# Patient Record
Sex: MALE | Race: BLACK OR AFRICAN AMERICAN | NOT HISPANIC OR LATINO
[De-identification: names, ages, dates, MRNs, and addresses within clinical notes are randomized per-mention and may not be internally consistent; named-entity substitution may affect disease eponyms.]

---

## 2023-08-03 PROBLEM — Z00.00 ENCOUNTER FOR PREVENTIVE HEALTH EXAMINATION: Status: ACTIVE | Noted: 2023-08-03

## 2023-08-07 ENCOUNTER — APPOINTMENT (OUTPATIENT)
Dept: CARDIOLOGY | Facility: CLINIC | Age: 73
End: 2023-08-07
Payer: MEDICARE

## 2023-08-07 VITALS
OXYGEN SATURATION: 97 % | DIASTOLIC BLOOD PRESSURE: 94 MMHG | SYSTOLIC BLOOD PRESSURE: 146 MMHG | HEART RATE: 69 BPM | WEIGHT: 243 LBS

## 2023-08-07 DIAGNOSIS — K57.90 DIVERTICULOSIS OF INTESTINE, PART UNSPECIFIED, W/OUT PERFORATION OR ABSCESS W/OUT BLEEDING: ICD-10-CM

## 2023-08-07 DIAGNOSIS — Z87.39 PERSONAL HISTORY OF OTHER DISEASES OF THE MUSCULOSKELETAL SYSTEM AND CONNECTIVE TISSUE: ICD-10-CM

## 2023-08-07 DIAGNOSIS — E78.5 HYPERLIPIDEMIA, UNSPECIFIED: ICD-10-CM

## 2023-08-07 DIAGNOSIS — N40.1 BENIGN PROSTATIC HYPERPLASIA WITH LOWER URINARY TRACT SYMPMS: ICD-10-CM

## 2023-08-07 DIAGNOSIS — I48.0 PAROXYSMAL ATRIAL FIBRILLATION: ICD-10-CM

## 2023-08-07 DIAGNOSIS — I10 ESSENTIAL (PRIMARY) HYPERTENSION: ICD-10-CM

## 2023-08-07 DIAGNOSIS — Z82.49 FAMILY HISTORY OF ISCHEMIC HEART DISEASE AND OTHER DISEASES OF THE CIRCULATORY SYSTEM: ICD-10-CM

## 2023-08-07 PROCEDURE — 99205 OFFICE O/P NEW HI 60 MIN: CPT | Mod: 25

## 2023-08-07 PROCEDURE — 93000 ELECTROCARDIOGRAM COMPLETE: CPT

## 2023-08-07 PROCEDURE — 36415 COLL VENOUS BLD VENIPUNCTURE: CPT

## 2023-08-07 RX ORDER — POTASSIUM CHLORIDE 10 MEQ
10 CAPSULE, EXTENDED RELEASE ORAL
Refills: 0 | Status: ACTIVE | COMMUNITY

## 2023-08-07 RX ORDER — LISINOPRIL AND HYDROCHLOROTHIAZIDE TABLETS 20; 12.5 MG/1; MG/1
20-12.5 TABLET ORAL
Refills: 0 | Status: ACTIVE | COMMUNITY

## 2023-08-07 RX ORDER — ASPIRIN 81 MG
81 TABLET,CHEWABLE ORAL
Refills: 0 | Status: ACTIVE | COMMUNITY

## 2023-08-07 RX ORDER — ATORVASTATIN CALCIUM 20 MG/1
20 TABLET, FILM COATED ORAL
Refills: 0 | Status: ACTIVE | COMMUNITY

## 2023-08-07 RX ORDER — LANSOPRAZOLE 30 MG
SUSPENSION,DELAYED RELEASE,RECONST. ORAL
Refills: 0 | Status: ACTIVE | COMMUNITY

## 2023-08-07 RX ORDER — TADALAFIL 5 MG/1
5 TABLET ORAL
Refills: 0 | Status: ACTIVE | COMMUNITY

## 2023-08-07 RX ORDER — CALCIUM CARBONATE/VITAMIN D3 600 MG-10
TABLET ORAL
Refills: 0 | Status: ACTIVE | COMMUNITY

## 2023-08-07 RX ORDER — FINASTERIDE 5 MG/1
5 TABLET, FILM COATED ORAL
Refills: 0 | Status: ACTIVE | COMMUNITY

## 2023-08-07 NOTE — HISTORY OF PRESENT ILLNESS
[FreeTextEntry1] : Mr Pedroza is a new patient being seen after a hospitalization at [facility] for atrial fibrillation.   He was feeling chest pressure 7/25/23, he was found to be afib , troponin level 15, left AMA.  Since discharge he has had some chest pressure for 3-4 days after, none since, denies chest pain, dyspnea, palpitations or syncope.  He is active in daily life no formal exercise. He is not taking his xarelto or metoprolol.

## 2023-08-07 NOTE — PHYSICAL EXAM

## 2023-08-08 LAB
ALBUMIN SERPL ELPH-MCNC: 4.3 G/DL
ALP BLD-CCNC: 96 U/L
ALT SERPL-CCNC: 33 U/L
ANION GAP SERPL CALC-SCNC: 14 MMOL/L
AST SERPL-CCNC: 35 U/L
BILIRUB SERPL-MCNC: 0.6 MG/DL
BUN SERPL-MCNC: 9 MG/DL
CALCIUM SERPL-MCNC: 9.6 MG/DL
CHLORIDE SERPL-SCNC: 105 MMOL/L
CHOLEST SERPL-MCNC: 168 MG/DL
CO2 SERPL-SCNC: 23 MMOL/L
CREAT SERPL-MCNC: 0.63 MG/DL
CRP SERPL HS-MCNC: 1.18 MG/L
EGFR: 102 ML/MIN/1.73M2
GLUCOSE SERPL-MCNC: 154 MG/DL
HDLC SERPL-MCNC: 82 MG/DL
LDLC SERPL CALC-MCNC: 66 MG/DL
NONHDLC SERPL-MCNC: 86 MG/DL
POTASSIUM SERPL-SCNC: 3.6 MMOL/L
PROT SERPL-MCNC: 6.8 G/DL
SODIUM SERPL-SCNC: 142 MMOL/L
TRIGL SERPL-MCNC: 120 MG/DL
TSH SERPL-ACNC: 0.79 UIU/ML

## 2023-08-09 LAB — APO LP(A) SERPL-MCNC: 209.4 NMOL/L

## 2023-08-20 NOTE — HISTORY OF PRESENT ILLNESS
[FreeTextEntry1] : Mr Pedroza is a new patient being seen after a hospitalization at [facility] for atrial fibrillation.   He was feeling chest pressure 7/25/23, he was found to be afib , troponin level 15, left AMA.  Since discharge he has had some chest pressure for 3-4 days after, none since, denies chest pain, dyspnea, palpitations or syncope.  He is active in daily life no formal exercise. He is not taking his xarelto or metoprolol.

## 2023-08-23 ENCOUNTER — APPOINTMENT (OUTPATIENT)
Dept: CARDIOLOGY | Facility: CLINIC | Age: 73
End: 2023-08-23

## 2023-11-15 ENCOUNTER — APPOINTMENT (OUTPATIENT)
Dept: CARDIOLOGY | Facility: CLINIC | Age: 73
End: 2023-11-15